# Patient Record
Sex: FEMALE | Race: WHITE | NOT HISPANIC OR LATINO | Employment: OTHER | URBAN - METROPOLITAN AREA
[De-identification: names, ages, dates, MRNs, and addresses within clinical notes are randomized per-mention and may not be internally consistent; named-entity substitution may affect disease eponyms.]

---

## 2017-04-07 NOTE — PATIENT DISCUSSION
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 IOP is normal with NCT today Strong family history of glaucoma-paternal and maternal grandmothers as well as sister, recommend records release to evaluate history - Plan : Monitor for changes. Advised patient to call our office when decreased vision or increased eye pain.  RV 6 months ONH OCT/TN CHECK/PACHY

## 2017-12-20 ENCOUNTER — IOP CHECK (OUTPATIENT)
Dept: URBAN - METROPOLITAN AREA CLINIC 39 | Facility: CLINIC | Age: 67
End: 2017-12-20

## 2017-12-20 DIAGNOSIS — H40.013: ICD-10-CM

## 2017-12-20 PROCEDURE — G8427 DOCREV CUR MEDS BY ELIG CLIN: HCPCS

## 2017-12-20 PROCEDURE — 1036F TOBACCO NON-USER: CPT

## 2017-12-20 PROCEDURE — 92012 INTRM OPH EXAM EST PATIENT: CPT

## 2017-12-20 PROCEDURE — G8756 NO BP MEASURE DOC: HCPCS

## 2017-12-20 ASSESSMENT — TONOMETRY
OD_IOP_MMHG: 13
OS_IOP_MMHG: 11

## 2017-12-20 ASSESSMENT — VISUAL ACUITY
OS_CC: 20/20-2
OD_CC: 20/25

## 2017-12-28 ENCOUNTER — EST. PATIENT EMERGENCY (OUTPATIENT)
Dept: URBAN - METROPOLITAN AREA CLINIC 39 | Facility: CLINIC | Age: 67
End: 2017-12-28

## 2017-12-28 DIAGNOSIS — H10.502: ICD-10-CM

## 2017-12-28 DIAGNOSIS — H40.033: ICD-10-CM

## 2017-12-28 DIAGNOSIS — H00.025: ICD-10-CM

## 2017-12-28 DIAGNOSIS — H00.022: ICD-10-CM

## 2017-12-28 PROCEDURE — G8756 NO BP MEASURE DOC: HCPCS

## 2017-12-28 PROCEDURE — 1036F TOBACCO NON-USER: CPT

## 2017-12-28 PROCEDURE — G8427 DOCREV CUR MEDS BY ELIG CLIN: HCPCS

## 2017-12-28 PROCEDURE — 92012 INTRM OPH EXAM EST PATIENT: CPT

## 2017-12-28 RX ORDER — NEOMYCIN SULFATE, POLYMYXIN B SULFATE AND DEXAMETHASONE 3.5; 10000; 1 MG/ML; [USP'U]/ML; MG/ML
1 SUSPENSION OPHTHALMIC
Start: 2017-12-28 | End: 2018-01-11

## 2017-12-28 ASSESSMENT — VISUAL ACUITY
OD_SC: 20/30-2
OS_SC: 20/25

## 2017-12-28 ASSESSMENT — TONOMETRY
OD_IOP_MMHG: 15
OS_IOP_MMHG: 15

## 2018-04-10 NOTE — PATIENT DISCUSSION
(H25.013) Cortical age-related cataract, bilateral - Assesment : Examination revealed Cortical Senile Cataract. - Plan : Monitor for changes. Advised patient to call our office with decreased vision or increased symptoms.  Updated Rx for glasses given to patient 1 year Exam-octo

## 2018-04-10 NOTE — PATIENT DISCUSSION
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 Strong family history of glaucoma-paternal and maternal grandmothers as well as sister Iop stable today - Plan : Monitor for changes. Advised patient to call our office when decreased vision or increased eye pain. RV 6 months ONH OCT/TN CHECK/PACHY Optical coherence tomography performed.

## 2018-06-13 ENCOUNTER — ESTABLISHED COMPREHENSIVE EXAM (OUTPATIENT)
Dept: URBAN - METROPOLITAN AREA CLINIC 39 | Facility: CLINIC | Age: 68
End: 2018-06-13

## 2018-06-13 DIAGNOSIS — H40.013: ICD-10-CM

## 2018-06-13 PROCEDURE — 92012 INTRM OPH EXAM EST PATIENT: CPT

## 2018-06-13 PROCEDURE — G8427 DOCREV CUR MEDS BY ELIG CLIN: HCPCS

## 2018-06-13 PROCEDURE — 1036F TOBACCO NON-USER: CPT

## 2018-06-13 PROCEDURE — 92133 CPTRZD OPH DX IMG PST SGM ON: CPT

## 2018-06-13 PROCEDURE — G8756 NO BP MEASURE DOC: HCPCS

## 2018-06-13 RX ORDER — DOXYCYCLINE HYCLATE 100MG 100 MG/1: 1 CAPSULE ORAL TWICE A DAY

## 2018-06-13 RX ORDER — ERYTHROMYCIN 5 MG/G: OINTMENT OPHTHALMIC TWICE A DAY

## 2018-06-13 ASSESSMENT — VISUAL ACUITY
OS_CC: 20/40+1
OS_SC: J8
OS_CC: J3
OD_SC: 20/70-1
OD_BAT: 20/70
OS_SC: 20/200
OD_CC: 20/25
OD_CC: J2
OD_PAM: 20/20
OD_SC: >J12
OS_AM: 20/20
OS_BAT: 20/60

## 2018-06-13 ASSESSMENT — TONOMETRY
OS_IOP_MMHG: 13
OD_IOP_MMHG: 13

## 2019-05-01 ENCOUNTER — ESTABLISHED COMPREHENSIVE EXAM (OUTPATIENT)
Dept: URBAN - METROPOLITAN AREA CLINIC 39 | Facility: CLINIC | Age: 69
End: 2019-05-01

## 2019-05-01 DIAGNOSIS — H40.013: ICD-10-CM

## 2019-05-01 PROCEDURE — 92015 DETERMINE REFRACTIVE STATE: CPT

## 2019-05-01 PROCEDURE — 92083 EXTENDED VISUAL FIELD XM: CPT

## 2019-05-01 PROCEDURE — 92012 INTRM OPH EXAM EST PATIENT: CPT

## 2019-05-01 ASSESSMENT — VISUAL ACUITY
OS_CC: 20/40-1
OD_BAT: 20/70
OD_SC: <J12
OS_SC: <J12
OS_SC: 20/60-1
OD_RAM: 20/20
OS_CC: J2
OS_AM: 20/20
OD_CC: 20/40-1
OD_CC: J3
OD_SC: 20/100
OS_BAT: 20/80

## 2019-05-01 ASSESSMENT — TONOMETRY
OD_IOP_MMHG: 15
OS_IOP_MMHG: 14

## 2019-05-02 NOTE — PATIENT DISCUSSION
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 (+) FHx of Glaucoma - mother,father, sister. ONH OCT No evidence of NFL damage OU - anomalous nerves. Patient had recent sinus sx and wanted to have her eyes double checked. Denies diplopia. - Plan : Monitor for changes. Advised patient to call our office when decreased vision or increased eye pain.   RV 1 year VF 24-2/Exam.

## 2019-05-02 NOTE — PATIENT DISCUSSION
(H01.00B) Unspecified blepharitis left eye, upper and lower eyelids - Assesment : Examination revealed Blepharitis. - Plan : see plan #3.

## 2019-05-02 NOTE — PATIENT DISCUSSION
(H01.00A) Unspecified blepharitis right eye, upper and lower eyelids - Assesment : Examination revealed Blepharitis. - Plan : Recommend warm compresses a few times weekly for maintenance. Monitor for changes.

## 2019-05-22 ENCOUNTER — SURGICAL TESTING (OUTPATIENT)
Dept: URBAN - METROPOLITAN AREA CLINIC 39 | Facility: CLINIC | Age: 69
End: 2019-05-22

## 2019-05-22 DIAGNOSIS — H40.033: ICD-10-CM

## 2019-05-22 DIAGNOSIS — H40.013: ICD-10-CM

## 2019-05-22 DIAGNOSIS — H25.813: ICD-10-CM

## 2019-05-22 PROCEDURE — V2799I IMPRIMIS PREDGATIBROM

## 2019-05-22 PROCEDURE — 92025-2 CORNEAL TOPOGRAPHY, PT

## 2019-05-22 PROCEDURE — 99499 UNLISTED E&M SERVICE: CPT

## 2019-05-22 PROCEDURE — 92136TC INTERFEROMETRY - TECHNICAL COMPONENT

## 2019-06-10 ENCOUNTER — SURGERY/PROCEDURE (OUTPATIENT)
Dept: URBAN - METROPOLITAN AREA CLINIC 39 | Facility: CLINIC | Age: 69
End: 2019-06-10

## 2019-06-10 ENCOUNTER — PRE-OP/H&P (OUTPATIENT)
Dept: URBAN - METROPOLITAN AREA SURGERY 14 | Facility: SURGERY | Age: 69
End: 2019-06-10

## 2019-06-10 DIAGNOSIS — H25.811: ICD-10-CM

## 2019-06-10 PROCEDURE — 66999LNSR LENSAR LASER FOR CAT SX

## 2019-06-10 PROCEDURE — 66984CV REMOVE CATARACT, INSERT LENS, CUSTOM VISION

## 2019-06-11 ENCOUNTER — POST-OP (OUTPATIENT)
Dept: URBAN - METROPOLITAN AREA CLINIC 35 | Facility: CLINIC | Age: 69
End: 2019-06-11

## 2019-06-11 DIAGNOSIS — Z96.1: ICD-10-CM

## 2019-06-11 PROCEDURE — 99024 POSTOP FOLLOW-UP VISIT: CPT

## 2019-06-11 ASSESSMENT — VISUAL ACUITY: OD_SC: 20/100+1

## 2019-06-17 ENCOUNTER — POST-OP (OUTPATIENT)
Dept: URBAN - METROPOLITAN AREA SURGERY 14 | Facility: SURGERY | Age: 69
End: 2019-06-17

## 2019-06-17 ENCOUNTER — SURGERY/PROCEDURE (OUTPATIENT)
Dept: URBAN - METROPOLITAN AREA CLINIC 39 | Facility: CLINIC | Age: 69
End: 2019-06-17

## 2019-06-17 DIAGNOSIS — Z96.1: ICD-10-CM

## 2019-06-17 DIAGNOSIS — H25.812: ICD-10-CM

## 2019-06-17 PROCEDURE — 65772LRI LRI DURING CAT SX

## 2019-06-17 PROCEDURE — 66984CV REMOVE CATARACT, INSERT LENS, CUSTOM VISION

## 2019-06-17 PROCEDURE — 66999LNSR LENSAR LASER FOR CAT SX

## 2019-06-17 ASSESSMENT — TONOMETRY: OD_IOP_MMHG: 15

## 2019-06-17 ASSESSMENT — VISUAL ACUITY: OD_SC: 20/30+2

## 2019-06-18 ENCOUNTER — CATARACT POST-OP 1-DAY (OUTPATIENT)
Dept: URBAN - METROPOLITAN AREA CLINIC 35 | Facility: CLINIC | Age: 69
End: 2019-06-18

## 2019-06-18 DIAGNOSIS — Z96.1: ICD-10-CM

## 2019-06-18 PROCEDURE — 99024 POSTOP FOLLOW-UP VISIT: CPT

## 2019-06-18 ASSESSMENT — VISUAL ACUITY
OD_SC: 20/40
OS_SC: 20/25

## 2019-06-18 ASSESSMENT — TONOMETRY
OD_IOP_MMHG: 14
OS_IOP_MMHG: 14

## 2019-07-10 ENCOUNTER — POST-OP CATARACT (OUTPATIENT)
Dept: URBAN - METROPOLITAN AREA CLINIC 35 | Facility: CLINIC | Age: 69
End: 2019-07-10

## 2019-07-10 DIAGNOSIS — Z96.1: ICD-10-CM

## 2019-07-10 PROCEDURE — 99024 POSTOP FOLLOW-UP VISIT: CPT

## 2019-07-10 ASSESSMENT — TONOMETRY
OS_IOP_MMHG: 12
OD_IOP_MMHG: 12

## 2019-07-10 ASSESSMENT — VISUAL ACUITY
OD_SC: 20/30
OS_SC: 20/25
OS_CC: J3
OD_CC: J4-
OU_CC: J3
OU_SC: 20/20-2

## 2022-02-10 ENCOUNTER — EMERGENCY VISIT (OUTPATIENT)
Dept: URBAN - METROPOLITAN AREA CLINIC 39 | Facility: CLINIC | Age: 72
End: 2022-02-10

## 2022-02-10 DIAGNOSIS — H01.02B: ICD-10-CM

## 2022-02-10 DIAGNOSIS — H01.02A: ICD-10-CM

## 2022-02-10 DIAGNOSIS — H00.021: ICD-10-CM

## 2022-02-10 PROCEDURE — 92012 INTRM OPH EXAM EST PATIENT: CPT

## 2022-02-10 RX ORDER — NEOMYCIN SULFATE, POLYMYXIN B SULFATE AND DEXAMETHASONE 3.5; 10000; 1 MG/ML; [USP'U]/ML; MG/ML
1 SUSPENSION OPHTHALMIC
Start: 2022-02-10 | End: 2022-02-24

## 2022-02-10 ASSESSMENT — VISUAL ACUITY
OD_PH: 20/30-2
OS_SC: 20/20
OD_SC: 20/60+2

## 2022-02-10 ASSESSMENT — TONOMETRY
OS_IOP_MMHG: 13
OD_IOP_MMHG: 13

## 2022-03-17 ENCOUNTER — COMPREHENSIVE EXAM (OUTPATIENT)
Dept: URBAN - METROPOLITAN AREA CLINIC 39 | Facility: CLINIC | Age: 72
End: 2022-03-17

## 2022-03-17 DIAGNOSIS — H26.493: ICD-10-CM

## 2022-03-17 DIAGNOSIS — H02.88B: ICD-10-CM

## 2022-03-17 DIAGNOSIS — H43.393: ICD-10-CM

## 2022-03-17 DIAGNOSIS — H16.223: ICD-10-CM

## 2022-03-17 DIAGNOSIS — H01.02A: ICD-10-CM

## 2022-03-17 DIAGNOSIS — H52.13: ICD-10-CM

## 2022-03-17 DIAGNOSIS — H02.88A: ICD-10-CM

## 2022-03-17 DIAGNOSIS — H40.033: ICD-10-CM

## 2022-03-17 DIAGNOSIS — H01.02B: ICD-10-CM

## 2022-03-17 DIAGNOSIS — H00.021: ICD-10-CM

## 2022-03-17 DIAGNOSIS — H40.013: ICD-10-CM

## 2022-03-17 PROCEDURE — 92014 COMPRE OPH EXAM EST PT 1/>: CPT

## 2022-03-17 PROCEDURE — 92015 DETERMINE REFRACTIVE STATE: CPT

## 2022-03-17 RX ORDER — MINERAL OIL 2; 2 MG/.4ML; MG/.4ML: 1 EMULSION OPHTHALMIC

## 2022-03-17 RX ORDER — CYCLOSPORINE 0.5 MG/ML: 1 EMULSION OPHTHALMIC TWICE A DAY

## 2022-03-17 ASSESSMENT — VISUAL ACUITY
OU_CC: J1
OD_SC: J8
OD_SC: 20/60-2
OU_SC: 20/30-2
OS_CC: J2
OS_SC: 20/30-2
OU_SC: J2
OD_CC: J3
OS_SC: J10

## 2022-03-17 ASSESSMENT — TONOMETRY
OS_IOP_MMHG: 12
OD_IOP_MMHG: 12

## 2022-04-27 ENCOUNTER — CONSULTATION/EVALUATION (OUTPATIENT)
Dept: URBAN - METROPOLITAN AREA CLINIC 39 | Facility: CLINIC | Age: 72
End: 2022-04-27

## 2022-04-27 ENCOUNTER — SURGERY/PROCEDURE (OUTPATIENT)
Dept: URBAN - METROPOLITAN AREA SURGERY 14 | Facility: SURGERY | Age: 72
End: 2022-04-27

## 2022-04-27 DIAGNOSIS — Z96.1: ICD-10-CM

## 2022-04-27 DIAGNOSIS — H26.493: ICD-10-CM

## 2022-04-27 PROCEDURE — 92004 COMPRE OPH EXAM NEW PT 1/>: CPT

## 2022-04-27 PROCEDURE — 6682150 YAG CAPSULOTOMY

## 2022-04-27 ASSESSMENT — VISUAL ACUITY
OD_SC: 20/70
OS_BAT: 20/60
OS_SC: J10
OD_BAT: 20/60
OD_CC: J2
OD_SC: J10
OS_SC: 20/50
OS_CC: J3

## 2022-04-27 ASSESSMENT — TONOMETRY
OD_IOP_MMHG: 10
OS_IOP_MMHG: 12

## 2022-05-04 ENCOUNTER — POST-OP (OUTPATIENT)
Dept: URBAN - METROPOLITAN AREA CLINIC 39 | Facility: CLINIC | Age: 72
End: 2022-05-04

## 2022-05-04 DIAGNOSIS — Z98.890: ICD-10-CM

## 2022-05-04 PROCEDURE — 99024 POSTOP FOLLOW-UP VISIT: CPT

## 2022-05-04 ASSESSMENT — TONOMETRY
OS_IOP_MMHG: 11
OD_IOP_MMHG: 11

## 2022-05-04 ASSESSMENT — VISUAL ACUITY
OU_SC: 20/40-1
OU_SC: J2
OD_SC: J3
OS_SC: 20/30-2
OS_SC: J5
OD_SC: 20/70+2

## 2024-08-22 ENCOUNTER — COMPREHENSIVE EXAM (OUTPATIENT)
Dept: URBAN - METROPOLITAN AREA CLINIC 39 | Facility: CLINIC | Age: 74
End: 2024-08-22

## 2024-08-22 DIAGNOSIS — H02.88B: ICD-10-CM

## 2024-08-22 DIAGNOSIS — Z96.1: ICD-10-CM

## 2024-08-22 DIAGNOSIS — H16.223: ICD-10-CM

## 2024-08-22 DIAGNOSIS — H40.013: ICD-10-CM

## 2024-08-22 DIAGNOSIS — H01.02A: ICD-10-CM

## 2024-08-22 DIAGNOSIS — H02.88A: ICD-10-CM

## 2024-08-22 DIAGNOSIS — H01.02B: ICD-10-CM

## 2024-08-22 DIAGNOSIS — H43.393: ICD-10-CM

## 2024-08-22 DIAGNOSIS — H40.033: ICD-10-CM

## 2024-08-22 PROCEDURE — 92014 COMPRE OPH EXAM EST PT 1/>: CPT

## 2024-08-22 PROCEDURE — 92015 DETERMINE REFRACTIVE STATE: CPT

## 2024-08-22 PROCEDURE — 92133 CPTRZD OPH DX IMG PST SGM ON: CPT

## 2024-08-22 ASSESSMENT — VISUAL ACUITY
OU_CC: J1+
OS_CC: J1
OD_CC: J1+
OS_CC: 20/20-2
OD_CC: 20/20
OU_CC: 20/20

## 2024-08-22 ASSESSMENT — TONOMETRY
OS_IOP_MMHG: 12
OD_IOP_MMHG: 11

## 2025-08-22 ENCOUNTER — COMPREHENSIVE EXAM (OUTPATIENT)
Age: 75
End: 2025-08-22

## 2025-08-22 DIAGNOSIS — H01.02A: ICD-10-CM

## 2025-08-22 DIAGNOSIS — H40.033: ICD-10-CM

## 2025-08-22 DIAGNOSIS — H02.88B: ICD-10-CM

## 2025-08-22 DIAGNOSIS — H02.88A: ICD-10-CM

## 2025-08-22 DIAGNOSIS — H43.393: ICD-10-CM

## 2025-08-22 DIAGNOSIS — H40.013: ICD-10-CM

## 2025-08-22 DIAGNOSIS — H16.223: ICD-10-CM

## 2025-08-22 DIAGNOSIS — H01.02B: ICD-10-CM

## 2025-08-22 DIAGNOSIS — Z96.1: ICD-10-CM

## 2025-08-22 PROCEDURE — 92014 COMPRE OPH EXAM EST PT 1/>: CPT

## 2025-08-22 PROCEDURE — 92133 CPTRZD OPH DX IMG PST SGM ON: CPT

## 2025-08-22 PROCEDURE — 92015 DETERMINE REFRACTIVE STATE: CPT
